# Patient Record
(demographics unavailable — no encounter records)

---

## 2025-05-07 NOTE — HISTORY OF PRESENT ILLNESS
[FreeTextEntry1] : ANAI is a 1 month old boy who was referred for cardiac consultation due to a heart murmur and fetal echocardiogram.  The fetal echocardiogram was limited due to being performed at 35 weeks gestational age, and I recommended  evaluation.   The murmur was first diagnosed in the NICU at [facility]. He was in NICU due to jaundice and was dx with G6PD deficiency. He was seen by ped cardio. Parents were told there was a murmur and to f/u with ped cardio at 6 months old.  He was readmitted to [facility], due to recurrent elevated bili, tx phototherapy.  He has nasal congestion for one week, afebrile.    He has been thriving at home. He has been feeding without difficulty and gaining weight appropriately.  There has been no tachypnea, increased work of breathing, cyanosis or syncope.  father- murmur in childhood, about 10 yo, seen by cardio and was not told to f/u

## 2025-05-07 NOTE — PHYSICAL EXAM
[General Appearance - Alert] : alert [General Appearance - In No Acute Distress] : in no acute distress [General Appearance - Well Nourished] : well nourished [General Appearance - Well Developed] : well developed [General Appearance - Well-Appearing] : well appearing [Appearance Of Head] : the head was normocephalic [Facies] : there were no dysmorphic facial features [Sclera] : the conjunctiva were normal [Outer Ear] : the ears and nose were normal in appearance [Examination Of The Oral Cavity] : mucous membranes were moist and pink [Auscultation Breath Sounds / Voice Sounds] : breath sounds clear to auscultation bilaterally [Normal Chest Appearance] : the chest was normal in appearance [Apical Impulse] : quiet precordium with normal apical impulse [Heart Rate And Rhythm] : normal heart rate and rhythm [Heart Sounds] : normal S1 and S2 [Heart Sounds Gallop] : no gallops [Heart Sounds Pericardial Friction Rub] : no pericardial rub [Edema] : no edema [Arterial Pulses] : normal upper and lower extremity pulses with no pulse delay [Heart Sounds Click] : no clicks [Capillary Refill Test] : normal capillary refill [Systolic] : systolic [II] : a grade 2/6 [LMSB] : LMSB  [LUSB] : LUSB [Ejection] : ejection [Low] : low pitched [No Diastolic Murmur] : no diastolic murmur was heard [Bowel Sounds] : normal bowel sounds [Abdomen Soft] : soft [Nondistended] : nondistended [Abdomen Tenderness] : non-tender [Nail Clubbing] : no clubbing  or cyanosis of the fingers [] : no rash [Skin Lesions] : no lesions [Skin Turgor] : normal turgor [Demonstrated Behavior - Infant Nonreactive To Parents] : interactive [Mood] : mood and affect were appropriate for age [Demonstrated Behavior] : normal behavior [FreeTextEntry7] : murmur radiates to bilateral axilla

## 2025-05-07 NOTE — REASON FOR VISIT
[Initial Consultation] : an initial consultation for [Parents] : parents [FreeTextEntry3] : Fetal Echocardiogram F/U

## 2025-05-07 NOTE — CONSULT LETTER
[Today's Date] : [unfilled] [Name] : Name: [unfilled] [] : : ~~ [Today's Date:] : [unfilled] [Dear  ___:] : Dear Dr. [unfilled]: [Consult] : I had the pleasure of evaluating your patient, [unfilled]. My full evaluation follows. [Consult - Single Provider] : Thank you very much for allowing me to participate in the care of this patient. If you have any questions, please do not hesitate to contact me. [Sincerely,] : Sincerely, [FreeTextEntry4] : Neema Caceres MD [FreeTextEntry5] : [address] [facility] [address], [address] [FreeTextEntry6] : [place], [place] [address] [de-identified] : Esha Narayan MD, FACC, FAAP, [facility] Pediatric Cardiology [facility]

## 2025-05-07 NOTE — PAST MEDICAL HISTORY
[At Term] : at term [Birth Weight:___] : [unfilled] weighed [unfilled] at birth. [Normal Vaginal Route] : by normal vaginal route [None] : No delivery complications [de-identified] : Low weight

## 2025-05-07 NOTE — CARDIOLOGY SUMMARY
[Today's Date] : [unfilled] [FreeTextEntry1] : Normal sinus rhythm. Borderline right ventricular hypertrophy. No ST segment or T-wave abnormality.  QTc 443 [FreeTextEntry2] : 1. Mild valvar pulmonary stenosis, peak systolic gradient 17 mm Hg by PW Doppler. There is mild redundancy of the pulmonary valve leaflets. 2. There is mild bilateral branch pulmonary stenosis, with peak systolic gradient 20 mm Hg in the LPA and 24 mm Hg in the RPA. 3. Trivial pulmonary valve regurgitation. 4. Physiologic tricuspid valve regurgitation, peak systolic instantaneous gradient 18.5 mmHg. 5. Normal left ventricular size, morphology and systolic function. 6. Normal right ventricular morphology with qualitatively normal size and systolic function. 7. No pericardial effusion

## 2025-05-07 NOTE — DISCUSSION/SUMMARY
[FreeTextEntry1] : - In summary, ANAI is a  40 day old male with mild valvar pulmonary stenosis. The gradient across the pulmonary valve is mild, and there is a mild further acceleration of flow into the branch pulmonary arteries. The natural history of mild valvar pulmonary stenosis is that it tends to improve or remain mild. Mild bilateral peripheral pulmonary stenosis is common in infancy and generally improves by 6 months old.  He is asymptomatic - No restrictions are needed - Pediatric cardiology follow-up in 6 months or sooner if there are any further cardiac concerns.  - The family verbalized understanding, and all questions were answered. [Needs SBE Prophylaxis] : [unfilled] does not need bacterial endocarditis prophylaxis

## 2025-05-07 NOTE — REVIEW OF SYSTEMS
[Penis Circumcised] : circumcised [Nl] : no feeding issues at this time. [] :  [___ Formula] : [unfilled] Formula  [___ ounces/feeding] : ~JENYN osman/feeding [___ Times/day] : [unfilled] times/day [Acting Fussy] : not acting ~L fussy [Fever] : no fever [Wgt Loss (___ Lbs)] : no recent weight loss [Pallor] : not pale [Discharge] : no discharge [Redness] : no redness [Nasal Discharge] : no nasal discharge [Nasal Stuffiness] : no nasal congestion [Stridor] : no stridor [Cyanosis] : no cyanosis [Edema] : no edema [Diaphoresis] : not diaphoretic [Tachypnea] : not tachypneic [Wheezing] : no wheezing [Cough] : no cough [Being A Poor Eater] : not a poor eater [Vomiting] : no vomiting [Diarrhea] : no diarrhea [Decrease In Appetite] : appetite not decreased [Fainting (Syncope)] : no fainting [Dec Consciousness] :  no decrease in consciousness [Seizure] : no seizures [Hypotonicity (Flaccid)] : not hypotonic [Refusal to Bear Wgt] : normal weight bearing [Puffy Hands/Feet] : no hand/feet puffiness [Rash] : no rash [Hemangioma] : no hemangioma [Jaundice] : no jaundice [Wound problems] : no wound problems [Bruising] : no tendency for easy bruising [Swollen Glands] : no lymphadenopathy [Enlarged [place]] : the fontanelle was not enlarged [Hoarse Cry] : no hoarse cry [Failure To Thrive] : no failure to thrive [Undescended Testes] : no undescended testicle [Ambiguous Genitals] : genitals not ambiguous [Dec Urine Output] : no oliguria [Solid Foods] : No solid food at this time